# Patient Record
Sex: MALE | Race: WHITE
[De-identification: names, ages, dates, MRNs, and addresses within clinical notes are randomized per-mention and may not be internally consistent; named-entity substitution may affect disease eponyms.]

---

## 2018-04-12 ENCOUNTER — HOSPITAL ENCOUNTER (EMERGENCY)
Dept: HOSPITAL 17 - NEPC | Age: 58
Discharge: LEFT BEFORE DISCHARGE | End: 2018-04-12
Payer: SELF-PAY

## 2018-04-12 VITALS
HEART RATE: 122 BPM | DIASTOLIC BLOOD PRESSURE: 100 MMHG | SYSTOLIC BLOOD PRESSURE: 171 MMHG | OXYGEN SATURATION: 95 % | RESPIRATION RATE: 18 BRPM

## 2018-04-12 VITALS
SYSTOLIC BLOOD PRESSURE: 169 MMHG | OXYGEN SATURATION: 95 % | RESPIRATION RATE: 24 BRPM | DIASTOLIC BLOOD PRESSURE: 90 MMHG | HEART RATE: 116 BPM

## 2018-04-12 VITALS — BODY MASS INDEX: 20.9 KG/M2 | WEIGHT: 154.32 LBS | HEIGHT: 72 IN

## 2018-04-12 VITALS
TEMPERATURE: 97.5 F | HEART RATE: 121 BPM | DIASTOLIC BLOOD PRESSURE: 111 MMHG | RESPIRATION RATE: 17 BRPM | OXYGEN SATURATION: 96 % | SYSTOLIC BLOOD PRESSURE: 195 MMHG

## 2018-04-12 DIAGNOSIS — R56.9: Primary | ICD-10-CM

## 2018-04-12 DIAGNOSIS — F17.200: ICD-10-CM

## 2018-04-12 LAB
ALBUMIN SERPL-MCNC: 3.6 GM/DL (ref 3.4–5)
ALP SERPL-CCNC: 48 U/L (ref 45–117)
ALT SERPL-CCNC: 18 U/L (ref 12–78)
AST SERPL-CCNC: 29 U/L (ref 15–37)
BASOPHILS # BLD AUTO: 0 TH/MM3 (ref 0–0.2)
BASOPHILS NFR BLD: 0.6 % (ref 0–2)
BILIRUB SERPL-MCNC: 0.7 MG/DL (ref 0.2–1)
BUN SERPL-MCNC: 9 MG/DL (ref 7–18)
CALCIUM SERPL-MCNC: 8.7 MG/DL (ref 8.5–10.1)
CHLORIDE SERPL-SCNC: 104 MEQ/L (ref 98–107)
COLOR UR: YELLOW
CREAT SERPL-MCNC: 1.24 MG/DL (ref 0.6–1.3)
EOSINOPHIL # BLD: 0.1 TH/MM3 (ref 0–0.4)
EOSINOPHIL NFR BLD: 0.8 % (ref 0–4)
ERYTHROCYTE [DISTWIDTH] IN BLOOD BY AUTOMATED COUNT: 13.5 % (ref 11.6–17.2)
GFR SERPLBLD BASED ON 1.73 SQ M-ARVRAT: 60 ML/MIN (ref 89–?)
GLUCOSE SERPL-MCNC: 213 MG/DL (ref 74–106)
GLUCOSE UR STRIP-MCNC: 1000 MG/DL
HCO3 BLD-SCNC: 20.9 MEQ/L (ref 21–32)
HCT VFR BLD CALC: 49.3 % (ref 39–51)
HGB BLD-MCNC: 17.1 GM/DL (ref 13–17)
HGB UR QL STRIP: (no result)
KETONES UR STRIP-MCNC: 10 MG/DL
LYMPHOCYTES # BLD AUTO: 1.8 TH/MM3 (ref 1–4.8)
LYMPHOCYTES NFR BLD AUTO: 24.4 % (ref 9–44)
MAGNESIUM SERPL-MCNC: 1.8 MG/DL (ref 1.5–2.5)
MCH RBC QN AUTO: 34.2 PG (ref 27–34)
MCHC RBC AUTO-ENTMCNC: 34.7 % (ref 32–36)
MCV RBC AUTO: 98.8 FL (ref 80–100)
MONOCYTE #: 0.5 TH/MM3 (ref 0–0.9)
MONOCYTES NFR BLD: 7.2 % (ref 0–8)
MUCOUS THREADS #/AREA URNS LPF: (no result) /LPF
NEUTROPHILS # BLD AUTO: 5.1 TH/MM3 (ref 1.8–7.7)
NEUTROPHILS NFR BLD AUTO: 67 % (ref 16–70)
NITRITE UR QL STRIP: (no result)
PLATELET # BLD: 264 TH/MM3 (ref 150–450)
PMV BLD AUTO: 6.6 FL (ref 7–11)
PROT SERPL-MCNC: 8 GM/DL (ref 6.4–8.2)
RBC # BLD AUTO: 5 MIL/MM3 (ref 4.5–5.9)
SODIUM SERPL-SCNC: 139 MEQ/L (ref 136–145)
SP GR UR STRIP: 1.03 (ref 1–1.03)
URINE LEUKOCYTE ESTERASE: (no result)
WBC # BLD AUTO: 7.6 TH/MM3 (ref 4–11)

## 2018-04-12 PROCEDURE — 93005 ELECTROCARDIOGRAM TRACING: CPT

## 2018-04-12 PROCEDURE — 96360 HYDRATION IV INFUSION INIT: CPT

## 2018-04-12 PROCEDURE — 85025 COMPLETE CBC W/AUTO DIFF WBC: CPT

## 2018-04-12 PROCEDURE — 80307 DRUG TEST PRSMV CHEM ANLYZR: CPT

## 2018-04-12 PROCEDURE — 96361 HYDRATE IV INFUSION ADD-ON: CPT

## 2018-04-12 PROCEDURE — 83735 ASSAY OF MAGNESIUM: CPT

## 2018-04-12 PROCEDURE — 81001 URINALYSIS AUTO W/SCOPE: CPT

## 2018-04-12 PROCEDURE — 70450 CT HEAD/BRAIN W/O DYE: CPT

## 2018-04-12 PROCEDURE — 99285 EMERGENCY DEPT VISIT HI MDM: CPT

## 2018-04-12 PROCEDURE — 80053 COMPREHEN METABOLIC PANEL: CPT

## 2018-04-12 NOTE — PD
Physical Exam


Narrative


GENERAL: 57-year-old male in no apparent distress


SKIN: Focused skin assessment warm/dry.


HEAD: Atraumatic. Normocephalic. 


EYES: Pupils equal and round. No scleral icterus. No injection or drainage. 


ENT: No nasal bleeding or discharge.  Mucous membranes pink and moist.


NECK: Trachea midline. No JVD. 


CARDIOVASCULAR: Regular rate and rhythm.  


RESPIRATORY: No accessory muscle use. No increased effort


MUSCULOSKELETAL: No obvious deformities. No clubbing.  No cyanosis.  


NEUROLOGICAL: Awake and alert. No obvious cranial nerve deficits.  Motor 

grossly within normal limits. Normal speech.


PSYCHIATRIC: Appropriate mood and affect; insight and judgment normal.





Data


Data


Last Documented VS





Vital Signs








  Date Time  Temp Pulse Resp B/P (MAP) Pulse Ox O2 Delivery O2 Flow Rate FiO2


 


4/12/18 16:23  116 24 169/90 (116) 95 Room Air  


 


4/12/18 15:56 97.5       








Orders





 Orders


Complete Blood Count With Diff (4/12/18 16:03)


Alcohol (Ethanol) (4/12/18 16:03)


Drug Screen, Random Urine (4/12/18 16:03)


Electrocardiogram (4/12/18 )


Ct Brain W/O Iv Contrast(Rout) (4/12/18 )


Blood Glucose (4/12/18 16:03)


Ecg Monitoring (4/12/18 16:03)


Iv Access Insert/Monitor (4/12/18 16:03)


Oximetry (4/12/18 16:03)


Comprehensive Metabolic Panel (4/12/18 16:03)


Sodium Chloride 0.9% Flush (Ns Flush) (4/12/18 16:15)


Urinalysis - C+S If Indicated (4/12/18 16:03)


Magnesium (Mg) (4/12/18 16:03)


Sodium Chlor 0.9% 1000 Ml Inj (Ns 1000 M (4/12/18 16:15)


Lorazepam (Ativan) (4/12/18 16:15)





Labs





Laboratory Tests








Test


  4/12/18


16:00


 


White Blood Count 7.6 TH/MM3 


 


Red Blood Count 5.00 MIL/MM3 


 


Hemoglobin 17.1 GM/DL 


 


Hematocrit 49.3 % 


 


Mean Corpuscular Volume 98.8 FL 


 


Mean Corpuscular Hemoglobin 34.2 PG 


 


Mean Corpuscular Hemoglobin


Concent 34.7 % 


 


 


Red Cell Distribution Width 13.5 % 


 


Platelet Count 264 TH/MM3 


 


Mean Platelet Volume 6.6 FL 


 


Neutrophils (%) (Auto) 67.0 % 


 


Lymphocytes (%) (Auto) 24.4 % 


 


Monocytes (%) (Auto) 7.2 % 


 


Eosinophils (%) (Auto) 0.8 % 


 


Basophils (%) (Auto) 0.6 % 


 


Neutrophils # (Auto) 5.1 TH/MM3 


 


Lymphocytes # (Auto) 1.8 TH/MM3 


 


Monocytes # (Auto) 0.5 TH/MM3 


 


Eosinophils # (Auto) 0.1 TH/MM3 


 


Basophils # (Auto) 0.0 TH/MM3 


 


CBC Comment DIFF FINAL 


 


Differential Comment  


 


Urine Color YELLOW 


 


Urine Turbidity CLEAR 


 


Urine pH 6.5 


 


Urine Specific Gravity 1.026 


 


Urine Protein 100 mg/dL 


 


Urine Glucose (UA) 1000 mg/dL 


 


Urine Ketones 10 mg/dL 


 


Urine Occult Blood NEG 


 


Urine Nitrite NEG 


 


Urine Bilirubin NEG 


 


Urine Urobilinogen


  LESS THAN 2.0


MG/DL


 


Urine Leukocyte Esterase NEG 


 


Urine WBC 1 /hpf 


 


Urine Mucus FEW /lpf 


 


Microscopic Urinalysis Comment


  CULT NOT


INDICATED


 


Blood Urea Nitrogen 9 MG/DL 


 


Creatinine 1.24 MG/DL 


 


Random Glucose 213 MG/DL 


 


Total Protein 8.0 GM/DL 


 


Albumin 3.6 GM/DL 


 


Calcium Level 8.7 MG/DL 


 


Magnesium Level 1.8 MG/DL 


 


Alkaline Phosphatase 48 U/L 


 


Aspartate Amino Transf


(AST/SGOT) 29 U/L 


 


 


Alanine Aminotransferase


(ALT/SGPT) 18 U/L 


 


 


Total Bilirubin 0.7 MG/DL 


 


Sodium Level 139 MEQ/L 


 


Potassium Level 3.6 MEQ/L 


 


Chloride Level 104 MEQ/L 


 


Carbon Dioxide Level 20.9 MEQ/L 


 


Anion Gap 14 MEQ/L 


 


Estimat Glomerular Filtration


Rate 60 ML/MIN 


 


 


Urine Opiates Screen NEG 


 


Urine Barbiturates Screen NEG 


 


Urine Amphetamines Screen NEG 


 


Urine Benzodiazepines Screen NEG 


 


Urine Cocaine Screen NEG 


 


Urine Cannabinoids Screen POS 


 


Ethyl Alcohol Level


  LESS THAN 3


MG/DL











MDM


Supervised Visit with HERMINIA:  Yes


Interpretation(s)


CBC & BMP Diagram


4/12/18 16:00








Total Protein 8.0, Albumin 3.6, Calcium Level 8.7, Magnesium Level 1.8, 

Alkaline Phosphatase 48, Aspartate Amino Transf (AST/SGOT) 29, Alanine 

Aminotransferase (ALT/SGPT) 18, Total Bilirubin 0.7








Last 24 hours Impressions








Head CT 4/12/18 0000 Signed





Impressions: 





 Service Date/Time:  Thursday, April 12, 2018 17:20 - CONCLUSION:  No acute 





 disease.       Kalyan Fang MD 








Narrative Course


I, Dr. varner, have reviewed the advance practice practitioner's documentation 

and am in agreement, met with the patient face to face, made the diagnosis, and 

the medical decision making was done by me.  





*My assessment and Findings: 57-year-old male presents after witnessed tonic-

clonic seizure.  He drinks alcohol heavily every night and last drank last 

night.  Initial workup without emergent process.  Advise patient he stay in the 

hospital overnight for further workup of new onset seizure but he states he has 

to go home and take care of his dog.  He states no one else can watch the dog 

and after offering other alternatives these were all declined.  AMA: The risks 

of leaving against medical advice without further evaluation treatment were 

discussed with the patient.  These risks include cardiac dysfunction, cardiac 

dysrhythmia, possible heart attack, possible stroke or death.  The patient 

indicated understanding of these risks and appeared to have the capacity to 

make this decision.


Diagnosis





 Primary Impression:  


 Seizure


Referrals:  


Neurologist


call for appointment


Patient Instructions:  General Instructions, Generalized Tonic Clonic Seizures (

ED)


Departure Forms:  Tests/Procedures





***Additional Instruction:  


No driving until neurology clears you.


Follow up with neurologist.


Return to the emergency department for any acute, worsening of symptoms.


***Med/Other Pt SpecificInfo:  No Change to Meds


Disposition:  07 AGAINST MEDICAL ADVICE


Condition:  Stable











Fabiola Varner MD Apr 12, 2018 18:48

## 2018-04-12 NOTE — PD
HPI


Chief Complaint:  Seizure


Time Seen by Provider:  15:55


Travel History


International Travel<30 days:  No


Contact w/Intl Traveler<30days:  No


Traveled to known affect area:  No





History of Present Illness


HPI


57-year-old male presents to the emergency department via EMS for evaluation 

after he had a seizure.  According to EMS, the patient is a .  He was 

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  According to EMS, the customer thought it was 

strange because he was going 5 miles an hour to 35 mile per hour zone.  When 

the customer came out of [facility], he was having a tonic-clonic seizure.  When EMS 

arrived, he was postictal.  The patient has no history of seizures.  He reports 

no chronic medical problems and takes no prescribed medications.  At this time, 

he is alert and oriented to person, place, time.  He has no medical complaints.

  Denies any headache or visual changes.  No chest pain or shortness breath.  

No abdominal pain.  No nausea, vomiting, diarrhea.  He states that he drinks 2 

beers daily, last drink yesterday.  He denies any history of alcohol 

withdrawal.  Patient did have incontinence of his bowels as he has stool in his 

pants.  He denies any tongue biting.  He has no complaints at this time.  

Moderate severity.





PFSH


Past Medical History


Medical History:  Denies Significant Hx


Diminished Hearing:  No


Tetanus Vaccination:  > 5 Years


Pregnant?:  Not Pregnant





Past Surgical History


Surgical History:  No Previous Surgery





Social History


Alcohol Use:  Yes


Tobacco Use:  Yes (1 PPD)


Substance Use:  No





Allergies-Medications


(Allergen,Severity, Reaction):  


Coded Allergies:  


     No Known Allergies (Unverified , 12/10/15)


Reported Meds & Prescriptions





Reported Meds & Active Scripts


Active


Zyrtec (Cetirizine HCl) 10 Mg Tab 1 Tab PO DAILY 


Catapres-Tts-1 (Clonidine HCl) 0.1 Mg/24 Hr Patch 1 Tab PO BID 








Review of Systems


Except as stated in HPI:  all other systems reviewed are Neg





Physical Exam


Narrative


GENERAL: Well-nourished, well-developed male patient, afebrile.  Patient alert 

and oriented to person, place, time.


SKIN: Focused skin assessment warm/dry.  No lacerations or abrasions.


HEAD: Normocephalic.  Atraumatic.


ENT: Mucosa pink and moist. No erythema or exudates. No uvular edema. No uvular

, palatal, or tonsillar deviation. Airway patent. Nasal turbinates appear 

normal without nasal blood, purulent drainage or septal hematoma.  Bilateral 

tympanic membranes are clear without erythema or perforation.


EYES: No scleral icterus. No injection or drainage. 


NECK: Supple, trachea midline. No JVD or lymphadenopathy.


CARDIOVASCULAR: Regular rate and rhythm without murmurs, gallops, or rubs.  

Bilateral radial and pedal pulses 2+.


RESPIRATORY: Breath sounds equal bilaterally. No accessory muscle use.  Lungs 

sounds are clear to auscultation.


GASTROINTESTINAL: Abdomen soft, non-tender, nondistended. 


MUSCULOSKELETAL: No cyanosis, or edema. 


BACK: Nontender without obvious deformity. No CVA tenderness. 


NEUROLOGICAL: Awake and alert. Cranial nerves II through XII intact. Motor and 

sensory grossly within normal limits. Five out of 5 muscle strength in all 

muscle groups. Normal speech.  Finger to nose is normal bilaterally.  Heel-to-

shin is normal bilaterally





Data


Data


Last Documented VS





Vital Signs








  Date Time  Temp Pulse Resp B/P (MAP) Pulse Ox O2 Delivery O2 Flow Rate FiO2


 


4/12/18 18:48        


 


4/12/18 16:23  116 24  95 Room Air  


 


4/12/18 15:56 97.5       








Orders





 Orders


Complete Blood Count With Diff (4/12/18 16:03)


Alcohol (Ethanol) (4/12/18 16:03)


Drug Screen, Random Urine (4/12/18 16:03)


Electrocardiogram (4/12/18 )


Ct Brain W/O Iv Contrast(Rout) (4/12/18 )


Blood Glucose (4/12/18 16:03)


Ecg Monitoring (4/12/18 16:03)


Iv Access Insert/Monitor (4/12/18 16:03)


Oximetry (4/12/18 16:03)


Comprehensive Metabolic Panel (4/12/18 16:03)


Sodium Chloride 0.9% Flush (Ns Flush) (4/12/18 16:15)


Urinalysis - C+S If Indicated (4/12/18 16:03)


Magnesium (Mg) (4/12/18 16:03)


Sodium Chlor 0.9% 1000 Ml Inj (Ns 1000 M (4/12/18 16:15)


Lorazepam (Ativan) (4/12/18 16:15)





Labs





Laboratory Tests








Test


  4/12/18


16:00


 


White Blood Count 7.6 TH/MM3 


 


Red Blood Count 5.00 MIL/MM3 


 


Hemoglobin 17.1 GM/DL 


 


Hematocrit 49.3 % 


 


Mean Corpuscular Volume 98.8 FL 


 


Mean Corpuscular Hemoglobin 34.2 PG 


 


Mean Corpuscular Hemoglobin


Concent 34.7 % 


 


 


Red Cell Distribution Width 13.5 % 


 


Platelet Count 264 TH/MM3 


 


Mean Platelet Volume 6.6 FL 


 


Neutrophils (%) (Auto) 67.0 % 


 


Lymphocytes (%) (Auto) 24.4 % 


 


Monocytes (%) (Auto) 7.2 % 


 


Eosinophils (%) (Auto) 0.8 % 


 


Basophils (%) (Auto) 0.6 % 


 


Neutrophils # (Auto) 5.1 TH/MM3 


 


Lymphocytes # (Auto) 1.8 TH/MM3 


 


Monocytes # (Auto) 0.5 TH/MM3 


 


Eosinophils # (Auto) 0.1 TH/MM3 


 


Basophils # (Auto) 0.0 TH/MM3 


 


CBC Comment DIFF FINAL 


 


Differential Comment  


 


Urine Color YELLOW 


 


Urine Turbidity CLEAR 


 


Urine pH 6.5 


 


Urine Specific Gravity 1.026 


 


Urine Protein 100 mg/dL 


 


Urine Glucose (UA) 1000 mg/dL 


 


Urine Ketones 10 mg/dL 


 


Urine Occult Blood NEG 


 


Urine Nitrite NEG 


 


Urine Bilirubin NEG 


 


Urine Urobilinogen


  LESS THAN 2.0


MG/DL


 


Urine Leukocyte Esterase NEG 


 


Urine WBC 1 /hpf 


 


Urine Mucus FEW /lpf 


 


Microscopic Urinalysis Comment


  CULT NOT


INDICATED


 


Blood Urea Nitrogen 9 MG/DL 


 


Creatinine 1.24 MG/DL 


 


Random Glucose 213 MG/DL 


 


Total Protein 8.0 GM/DL 


 


Albumin 3.6 GM/DL 


 


Calcium Level 8.7 MG/DL 


 


Magnesium Level 1.8 MG/DL 


 


Alkaline Phosphatase 48 U/L 


 


Aspartate Amino Transf


(AST/SGOT) 29 U/L 


 


 


Alanine Aminotransferase


(ALT/SGPT) 18 U/L 


 


 


Total Bilirubin 0.7 MG/DL 


 


Sodium Level 139 MEQ/L 


 


Potassium Level 3.6 MEQ/L 


 


Chloride Level 104 MEQ/L 


 


Carbon Dioxide Level 20.9 MEQ/L 


 


Anion Gap 14 MEQ/L 


 


Estimat Glomerular Filtration


Rate 60 ML/MIN 


 


 


Urine Opiates Screen NEG 


 


Urine Barbiturates Screen NEG 


 


Urine Amphetamines Screen NEG 


 


Urine Benzodiazepines Screen NEG 


 


Urine Cocaine Screen NEG 


 


Urine Cannabinoids Screen POS 


 


Ethyl Alcohol Level


  LESS THAN 3


MG/DL











MDM


Medical Decision Making


Medical Screen Exam Complete:  Yes


Emergency Medical Condition:  Yes


Medical Record Reviewed:  Yes


Differential Diagnosis


Electrolyte abnormality versus intracranial abnormality versus alcohol 

withdrawal versus new onset seizure disorder


Narrative Course


57-year-old male presents to the emergency department after having a seizure 

without history.  EKG, CBC, CMP, magnesium, UA, urine drug screen, alcohol 

level are ordered and pending.  CT of the brain is ordered and pending.  

Patient is given normal saline 1 L IV bolus, Ativan 1 mg by mouth.





EKG shows sinus tachycardia, heart rate 107, no acute ST changes.  CBC shows no 

acute abnormality.  CMP shows hyperglycemia at 213.  Magnesium is 1.8.  UA is 

negative for acute infection.  UDS is positive for cannabinoids.  Alcohol level 

is less than 3.  CT of the brain shows no acute disease.





Patient declines admission.  He is instructed to not drive and follow-up with 

neurology.  He is leaving AMA.  He is aware of the risks of leaving AMA





AMA: The risks of leaving against medical advice without further evaluation 

treatment were discussed with the patient. These risks include cardiac 

dysfunction, cardiac dysrhythmia, possible heart attack, possible stroke or 

death. The patient indicated understanding of these risks and appeared to have 

the capacity to make this decision.





Diagnosis





 Primary Impression:  


 Left against medical advice


Referrals:  


Neurologist


call for appointment


Patient Instructions:  General Instructions, Generalized Tonic Clonic Seizures (

ED)





***Additional Instructions:  


No driving until neurology clears you.


Follow up with neurologist.


Return to the emergency department for any acute, worsening of symptoms.


***Med/Other Pt SpecificInfo:  No Change to Meds


Disposition:  07 AGAINST MEDICAL ADVICE











Mona Ndiaye Apr 12, 2018 16:12

## 2018-04-12 NOTE — RADRPT
EXAM DATE/TIME:  04/12/2018 17:20 

 

[facility] COMPARISON:     

CT BRAIN W/O CONTRAST, December 10, 2015, 16:44.

 

 

INDICATIONS :     

Witnessed tonic clonic seizure.

                      

 

RADIATION DOSE:     

56.35 CTDIvol (mGy) 

 

 

 

MEDICAL HISTORY :     

None  

 

SURGICAL HISTORY :      

None. 

 

ENCOUNTER:      

Initial

 

ACUITY:      

1 day

 

PAIN SCALE:      

0/10

 

LOCATION:        

cranial 

 

TECHNIQUE:     

Multiple contiguous axial images were obtained of the head.  Using automated exposure control and adj
ustment of the mA and/or kV according to patient size, radiation dose was kept as low as reasonably a
chievable to obtain optimal diagnostic quality images.   DICOM format image data is available electro
nically for review and comparison.  

 

FINDINGS:     

 

CEREBRUM:     

The ventricles are normal for age.  No evidence of midline shift, mass lesion, hemorrhage or acute in
farction.  No extra-axial fluid collections are seen.

 

POSTERIOR FOSSA:     

The cerebellum and brainstem are intact.  The 4th ventricle is midline.  The cerebellopontine angle i
s unremarkable.

 

EXTRACRANIAL:     

The visualized portion of the orbits is intact.

 

SKULL:     

The calvaria is intact.  No evidence of skull fracture.

 

CONCLUSION:     

No acute disease.  

 

 

 

 Kalyan Fang MD on April 12, 2018 at 17:41           

Board Certified Radiologist.

 This report was verified electronically.

## 2018-04-14 NOTE — EKG
Date Performed: 2018       Time Performed: 16:54:06

 

PTAGE:      57 years

 

EKG:      SINUS TACHYCARDIA ABNORMAL RHYTHM ECG Compared to 

 

 PREVIOUS TRACING            , ST-T abnormality has resolved. PREVIOUS TRACIN/10/2015 16.19

 

DOCTOR:   Felix Mcclure  Interpretating Date/Time  2018 13:11:52